# Patient Record
Sex: MALE | Race: WHITE
[De-identification: names, ages, dates, MRNs, and addresses within clinical notes are randomized per-mention and may not be internally consistent; named-entity substitution may affect disease eponyms.]

---

## 2019-02-21 ENCOUNTER — HOSPITAL ENCOUNTER (OUTPATIENT)
Dept: HOSPITAL 61 - CT | Age: 54
Discharge: HOME | End: 2019-02-21
Payer: COMMERCIAL

## 2019-02-21 DIAGNOSIS — K76.0: ICD-10-CM

## 2019-02-21 DIAGNOSIS — K57.32: Primary | ICD-10-CM

## 2019-02-21 PROCEDURE — 74177 CT ABD & PELVIS W/CONTRAST: CPT

## 2019-02-21 NOTE — RAD
PQRS Compliance statement:

 

One or more of the following individualized dose reduction techniques were

utilized for this examination:

1. Automated exposure control.

2. Adjustment of the mA and/or kV according to patient size.

3. Use of iterative reconstruction technique.

 

Indication:LOWER ABD PAIN H/O DIVERTICULITIS INJ 75ML OMNI 300 NO PREV 

 

TECHNIQUE: CT abdomen and pelvis with IV contrast with multiplanar 

reformats.

 

COMPARISON: None

 

FINDINGS:

Heart is normal in size. No pericardial or pleural effusion. Clear lung 

bases. Diffuse hepatic steatosis. Spleen, gallbladder, pancreas, adrenals 

and kidneys are within normal limits. No enlarged retroperitoneal or 

pelvic adenopathy. No free pelvic fluid or ascites. Sigmoid 

diverticulosis. Short segment wall thickening of the sigmoid colon with 

chronic inflammatory changes. No bowel obstruction. Normal appendix. The 

prostate and seminal vesicles show no large mass. Urinary bladder is 

decompressed however shows no radiopaque stones. No pneumoperitoneum. No 

suspicious bony lesion.

 

IMPRESSION:

1. Acute sigmoid diverticulitis.

2. Hepatic steatosis.

 

Electronically signed by: Yohannes Chamorro DO (2/21/2019 1:24 PM) [id number]